# Patient Record
(demographics unavailable — no encounter records)

---

## 2018-02-16 NOTE — PD
HPI


.


Sore throat


Chief Complaint:  ENT Complaint


Time Seen by Provider:  19:27


Travel History


International Travel<30 days:  No


Contact w/Intl Traveler<30days:  No


Traveled to known affect area:  No





History of Present Illness


HPI


This patient presents with flulike symptoms that started yesterday.  She is 

complaining with headache, fevers and chills, sore throat and weakness.  She 

states that she took some ibuprofen last night but has not taken anything 

today.  No modifying factors.  Symptoms have been persistent.





PFSH


Past Medical History


Hx Anticoagulant Therapy:  No


COPD:  Yes


Diabetes:  No


Diminished Hearing:  No


GERD:  Yes


Immunizations Current:  No


Influenza Vaccination:  No


Pregnant?:  Not Pregnant


Menopausal:  Yes





Past Surgical History


Hysterectomy:  Yes (TUBAL)


Thoracic Surgery:  Yes (right lung)





Social History


Alcohol Use:  No


Tobacco Use:  No (quit in 2000)


Substance Use:  No





Allergies-Medications


(Allergen,Severity, Reaction):  


Coded Allergies:  


     morphine (Unverified  Allergy, Severe, VOMIT, 2/16/18)


Reported Meds & Prescriptions





Reported Meds & Active Scripts


Active


Reported


Ranitidine (Ranitidine HCl) 150 Mg Tab 150 Mg PO DAILY








Review of Systems


Except as stated in HPI:  all other systems reviewed are Neg


General / Constitutional:  Positive: Fever, Chills


HENT:  Positive: Sore Throat


Gastrointestinal:  Positive: Nausea, Vomiting


Neurologic:  Positive: Weakness





Physical Exam


Narrative


GENERAL: Awake and alert.  She acts like she feels poorly.


SKIN: Warm and dry.  Normal color and turgor.


HEAD: Normocephalic/atraumatic.


EYES: Pupils are equal.  Extraocular movements are intact.


ENT: Oropharynx has no erythema, tonsillar enlargement or exudate


NECK: Normal range of motion.  Supple.  No cervical adenopathy.


CARDIOVASCULAR: Sinus tachycardia.


RESPIRATORY: Nonlabored respirations.  Lungs are clear.


MUSCULOSKELETAL: Atraumatic.


NEUROLOGICAL: Nonfocal.


PSYCHIATRIC: Appropriate mood and affect.





Data


Data


Last Documented VS





Vital Signs








  Date Time  Temp Pulse Resp B/P (MAP) Pulse Ox O2 Delivery O2 Flow Rate FiO2


 


2/16/18 18:22 99.8 122 16 108/63 (78) 94   








Orders





 Orders


Sepsis Workup Initiated (2/16/18 )


Complete Blood Count With Diff (2/16/18 19:27)


Comprehensive Metabolic Panel (2/16/18 19:27)


Lactic Acid Sepsis Protocol (2/16/18 19:27)


Blood Culture (2/16/18 19:27)


Chest, Single Ap (2/16/18 19:27)


Ecg Monitoring (2/16/18 19:27)


Iv Access Insert/Monitor (2/16/18 19:27)


Oximetry (2/16/18 19:27)


Acetaminophen (Tylenol) (2/16/18 19:30)


Ibuprofen (Motrin) (2/16/18 19:30)


Sodium Chlor 0.9% 1000 Ml Inj (Ns 1000 M (2/16/18 19:30)


Group A Rapid Strep Screen (2/16/18 19:27)


Influenzae A/B Antigen (2/16/18 19:27)


Penicillin G Benzathine Inj (Bicillin L- (2/16/18 21:00)





Labs





Laboratory Tests








Test


  2/16/18


20:10


 


White Blood Count 13.9 TH/MM3 


 


Red Blood Count 4.61 MIL/MM3 


 


Hemoglobin 12.9 GM/DL 


 


Hematocrit 39.0 % 


 


Mean Corpuscular Volume 84.6 FL 


 


Mean Corpuscular Hemoglobin 28.0 PG 


 


Mean Corpuscular Hemoglobin


Concent 33.1 % 


 


 


Red Cell Distribution Width 13.9 % 


 


Platelet Count 174 TH/MM3 


 


Mean Platelet Volume 8.4 FL 


 


Neutrophils (%) (Auto) 80.9 % 


 


Lymphocytes (%) (Auto) 8.5 % 


 


Monocytes (%) (Auto) 6.9 % 


 


Eosinophils (%) (Auto) 0.1 % 


 


Basophils (%) (Auto) 3.6 % 


 


Neutrophils # (Auto) 11.2 TH/MM3 


 


Lymphocytes # (Auto) 1.2 TH/MM3 


 


Monocytes # (Auto) 1.0 TH/MM3 


 


Eosinophils # (Auto) 0.0 TH/MM3 


 


Basophils # (Auto) 0.5 TH/MM3 


 


CBC Comment DIFF FINAL 


 


Differential Comment  


 


Blood Urea Nitrogen 25 MG/DL 


 


Creatinine 1.00 MG/DL 


 


Random Glucose 108 MG/DL 


 


Total Protein 7.7 GM/DL 


 


Albumin 3.6 GM/DL 


 


Calcium Level 8.6 MG/DL 


 


Alkaline Phosphatase 78 U/L 


 


Aspartate Amino Transf


(AST/SGOT) 19 U/L 


 


 


Alanine Aminotransferase


(ALT/SGPT) 18 U/L 


 


 


Total Bilirubin 0.9 MG/DL 


 


Sodium Level 137 MEQ/L 


 


Potassium Level 3.5 MEQ/L 


 


Chloride Level 104 MEQ/L 


 


Carbon Dioxide Level 25.7 MEQ/L 


 


Anion Gap 7 MEQ/L 


 


Estimat Glomerular Filtration


Rate 58 ML/MIN 


 


 


Lactic Acid Level 0.9 mmol/L 











MDM


Medical Decision Making


Medical Screen Exam Complete:  Yes


Emergency Medical Condition:  Yes


Differential Diagnosis


Differential diagnosis of fever includes but is not limited to viral illness, 

strep throat, otitis media, pneumonia, sepsis, UTI


Narrative Course


This patient presents with typical flulike symptoms.  She is tachycardic.  I 

will give her fluids along with acetaminophen and ibuprofen.  Septic workup 

including rapid flu and rapid strep screen is in progress.








Last Impressions








Chest X-Ray 2/16/18 1927 Signed





Impressions: 





 Service Date/Time:  Friday, February 16, 2018 19:52 - CONCLUSION:  1. 

Emphysema 





 with lung staples at the right lung apex. No effusion. Heart size upper limits 





 normal.     César Conway MD 








The chest x-ray was independently viewed by me.





CBC & BMP Diagram


2/16/18 20:10








Total Protein 7.7, Albumin 3.6, Calcium Level 8.6, Alkaline Phosphatase 78, 

Aspartate Amino Transf (AST/SGOT) 19, Alanine Aminotransferase (ALT/SGPT) 18, 

Total Bilirubin 0.9





LA 0.9





Flu screen negative.





Strep screen positive.  She will be treated with Bicillin LA 1.2 million units 

IM.  She will then be discharged to home.





Sepsis Criteria


SIRS Criteria (2 or more):  Heart rate over 90, WBC > 91153, < 4000 or > 10% 

bands


Sepsis Criteria (SIRS+source):  Infect source susp/known


Criteria Outcome:  Meets SIRS criteria, Meets sepsis criteria





Diagnosis





 Primary Impression:  


 Fever


 Qualified Codes:  R50.9 - Fever, unspecified


 Additional Impressions:  


 Strep throat


 Sepsis


 Qualified Codes:  A40.0 - Sepsis due to Streptococcus, group A


Patient Instructions:  General Instructions, Strep Throat (DC)


Disposition:  01 DISCHARGE HOME


Condition:  Stable











Ariadna Farris MD Feb 16, 2018 19:32

## 2018-02-16 NOTE — RADRPT
EXAM DATE/TIME:  02/16/2018 19:52 

 

[facility] COMPARISON:     

CHEST PA & LAT, January 28, 2014, 10:57.

 

                     

INDICATIONS :     

Cough. 

                     

 

MEDICAL HISTORY :            

Gastroesophageal reflux disease. Chronic obstructive pulmonary disease   

 

SURGICAL HISTORY :     

Tubal ligation. Hysterectomy.  

 

ENCOUNTER:     

Initial                                        

 

ACUITY:     

2 days      

 

PAIN SCORE:     

2/10

 

LOCATION:     

Bilateral chest 

 

FINDINGS:     

There is underlying emphysema with lung staples at the right lung apex. No consolidation or effusion.
 Heart size within normal limits.

 

CONCLUSION:     

1. Emphysema with lung staples at the right lung apex. No effusion. Heart size upper limits normal.

 

 

 

 César Conway MD on February 16, 2018 at 20:07           

Board Certified Radiologist.

 This report was verified electronically.